# Patient Record
Sex: FEMALE | Race: WHITE | Employment: PART TIME
[De-identification: names, ages, dates, MRNs, and addresses within clinical notes are randomized per-mention and may not be internally consistent; named-entity substitution may affect disease eponyms.]

---

## 2017-08-19 ENCOUNTER — HEALTH MAINTENANCE LETTER (OUTPATIENT)
Age: 57
End: 2017-08-19

## 2019-11-06 ENCOUNTER — HEALTH MAINTENANCE LETTER (OUTPATIENT)
Age: 59
End: 2019-11-06

## 2020-11-29 ENCOUNTER — HEALTH MAINTENANCE LETTER (OUTPATIENT)
Age: 60
End: 2020-11-29

## 2021-09-19 ENCOUNTER — HEALTH MAINTENANCE LETTER (OUTPATIENT)
Age: 61
End: 2021-09-19

## 2021-11-14 ENCOUNTER — HEALTH MAINTENANCE LETTER (OUTPATIENT)
Age: 61
End: 2021-11-14

## 2021-12-21 NOTE — PATIENT DISCUSSION
They tried patching in 2019 but patient didn't like it so they quit.  Uncorrected vision is the same as it was in 2019.  She does have an rx but in the absence of complaints they don't seem inclined to correct it at this time.  Discussed the option to fill the rx going forward if vision becomes problematic.

## 2022-01-09 ENCOUNTER — HEALTH MAINTENANCE LETTER (OUTPATIENT)
Age: 62
End: 2022-01-09

## 2022-07-07 RX ORDER — OLMESARTAN MEDOXOMIL AND HYDROCHLOROTHIAZIDE 40/12.5 40; 12.5 MG/1; MG/1
TABLET ORAL
COMMUNITY

## 2022-07-07 RX ORDER — ESTRADIOL AND NORETHINDRONE ACETATE .5; .1 MG/1; MG/1
TABLET ORAL
COMMUNITY

## 2022-07-07 RX ORDER — LEVOTHYROXINE SODIUM 0.12 MG/1
TABLET ORAL
COMMUNITY
End: 2022-10-06

## 2022-07-07 RX ORDER — ROSUVASTATIN CALCIUM 10 MG/1
TABLET, COATED ORAL
COMMUNITY
Start: 2021-08-12 | End: 2022-10-06

## 2022-07-07 RX ORDER — OMEPRAZOLE 10 MG/1
1 CAPSULE, DELAYED RELEASE ORAL
COMMUNITY

## 2022-08-24 ENCOUNTER — NEW PATIENT (OUTPATIENT)
Dept: URBAN - METROPOLITAN AREA CLINIC 14 | Facility: CLINIC | Age: 62
End: 2022-08-24

## 2022-08-24 DIAGNOSIS — H02.831: ICD-10-CM

## 2022-08-24 DIAGNOSIS — H02.834: ICD-10-CM

## 2022-08-24 DIAGNOSIS — H02.835: ICD-10-CM

## 2022-08-24 DIAGNOSIS — H02.832: ICD-10-CM

## 2022-08-24 PROCEDURE — 92082 INTERMEDIATE VISUAL FIELD XM: CPT

## 2022-08-24 PROCEDURE — 99204 OFFICE O/P NEW MOD 45 MIN: CPT

## 2022-08-24 PROCEDURE — 92285 EXTERNAL OCULAR PHOTOGRAPHY: CPT

## 2022-08-24 ASSESSMENT — VISUAL ACUITY
OD_SC: 20/20-2
OS_SC: 20/30

## 2022-10-02 PROBLEM — E03.9 HYPOTHYROIDISM: Status: ACTIVE | Noted: 2022-10-02

## 2022-10-02 PROBLEM — E55.9 VITAMIN D DEFICIENCY: Status: ACTIVE | Noted: 2022-10-02

## 2022-10-02 PROBLEM — K21.9 GASTROESOPHAGEAL REFLUX DISEASE: Status: ACTIVE | Noted: 2022-10-02

## 2022-10-02 PROBLEM — E66.9 OBESITY (BMI 30-39.9): Status: ACTIVE | Noted: 2022-10-02

## 2022-10-02 PROBLEM — E78.2 MIXED HYPERLIPIDEMIA: Status: ACTIVE | Noted: 2022-10-02

## 2022-10-02 PROBLEM — M25.872 IMPINGEMENT SYNDROME OF LEFT ANKLE: Status: ACTIVE | Noted: 2022-10-02

## 2022-10-02 PROBLEM — R73.03 PREDIABETES: Status: ACTIVE | Noted: 2022-10-02

## 2022-10-02 PROBLEM — I10 ESSENTIAL HYPERTENSION: Status: ACTIVE | Noted: 2022-10-02

## 2022-10-02 PROBLEM — M20.41 HAMMERTOE OF RIGHT FOOT: Status: ACTIVE | Noted: 2022-10-02

## 2022-10-02 PROBLEM — M20.21 HALLUX RIGIDUS, RIGHT FOOT: Status: ACTIVE | Noted: 2022-10-02

## 2022-11-09 ENCOUNTER — NEW PATIENT (OUTPATIENT)
Dept: URBAN - METROPOLITAN AREA CLINIC 4 | Facility: CLINIC | Age: 62
End: 2022-11-09

## 2022-11-09 DIAGNOSIS — H43.812: ICD-10-CM

## 2022-11-09 DIAGNOSIS — H02.835: ICD-10-CM

## 2022-11-09 DIAGNOSIS — H02.834: ICD-10-CM

## 2022-11-09 DIAGNOSIS — H02.832: ICD-10-CM

## 2022-11-09 DIAGNOSIS — H02.831: ICD-10-CM

## 2022-11-09 PROCEDURE — 92014 COMPRE OPH EXAM EST PT 1/>: CPT

## 2022-11-09 PROCEDURE — 92134 CPTRZ OPH DX IMG PST SGM RTA: CPT

## 2022-11-09 ASSESSMENT — TONOMETRY
OS_IOP_MMHG: 13
OD_IOP_MMHG: 11

## 2022-11-09 ASSESSMENT — VISUAL ACUITY
OD_SC: 20/25+1
OS_SC: 20/25

## 2022-11-21 ENCOUNTER — HEALTH MAINTENANCE LETTER (OUTPATIENT)
Age: 62
End: 2022-11-21

## 2022-12-01 ENCOUNTER — FOLLOW UP (OUTPATIENT)
Dept: URBAN - METROPOLITAN AREA CLINIC 17 | Facility: CLINIC | Age: 62
End: 2022-12-01

## 2022-12-01 PROCEDURE — 92012 INTRM OPH EXAM EST PATIENT: CPT

## 2022-12-01 ASSESSMENT — VISUAL ACUITY
OS_SC: 20/20
OD_SC: 20/25-2

## 2022-12-01 ASSESSMENT — TONOMETRY
OD_IOP_MMHG: 18
OS_IOP_MMHG: 22

## 2022-12-20 ENCOUNTER — TECH ONLY (OUTPATIENT)
Dept: URBAN - METROPOLITAN AREA CLINIC 14 | Facility: CLINIC | Age: 62
End: 2022-12-20

## 2022-12-20 PROCEDURE — 99024 POSTOP FOLLOW-UP VISIT: CPT

## 2022-12-20 ASSESSMENT — VISUAL ACUITY
OS_SC: 20/25
OD_SC: 20/25-2

## 2023-01-27 ENCOUNTER — POST-OP (OUTPATIENT)
Dept: URBAN - METROPOLITAN AREA CLINIC 14 | Facility: CLINIC | Age: 63
End: 2023-01-27

## 2023-01-27 DIAGNOSIS — H02.834: ICD-10-CM

## 2023-01-27 DIAGNOSIS — Z98.890: ICD-10-CM

## 2023-01-27 DIAGNOSIS — H02.832: ICD-10-CM

## 2023-01-27 DIAGNOSIS — H02.835: ICD-10-CM

## 2023-01-27 DIAGNOSIS — H02.831: ICD-10-CM

## 2023-01-27 PROCEDURE — 99024 POSTOP FOLLOW-UP VISIT: CPT

## 2023-01-27 ASSESSMENT — VISUAL ACUITY
OD_SC: 20/25-2
OS_SC: 20/25

## 2023-04-16 ENCOUNTER — HEALTH MAINTENANCE LETTER (OUTPATIENT)
Age: 63
End: 2023-04-16

## 2023-11-26 ENCOUNTER — HEALTH MAINTENANCE LETTER (OUTPATIENT)
Age: 63
End: 2023-11-26

## 2024-09-11 ENCOUNTER — COMPREHENSIVE EXAM (OUTPATIENT)
Dept: URBAN - METROPOLITAN AREA CLINIC 17 | Facility: CLINIC | Age: 64
End: 2024-09-11

## 2024-09-11 DIAGNOSIS — E11.9: ICD-10-CM

## 2024-09-11 DIAGNOSIS — H43.813: ICD-10-CM

## 2024-09-11 DIAGNOSIS — H25.13: ICD-10-CM

## 2024-09-11 DIAGNOSIS — H40.013: ICD-10-CM

## 2024-09-11 PROCEDURE — 99214 OFFICE O/P EST MOD 30 MIN: CPT

## 2024-09-11 PROCEDURE — 92015 DETERMINE REFRACTIVE STATE: CPT

## 2024-11-07 ENCOUNTER — DIAGNOSTICS ONLY (OUTPATIENT)
Dept: URBAN - METROPOLITAN AREA CLINIC 17 | Facility: CLINIC | Age: 64
End: 2024-11-07

## 2024-11-07 DIAGNOSIS — H40.013: ICD-10-CM

## 2024-11-07 PROCEDURE — 92083 EXTENDED VISUAL FIELD XM: CPT

## 2024-11-07 PROCEDURE — 92133 CPTRZD OPH DX IMG PST SGM ON: CPT

## 2025-03-12 ENCOUNTER — FOLLOW UP (OUTPATIENT)
Age: 65
End: 2025-03-12

## 2025-03-12 DIAGNOSIS — H40.013: ICD-10-CM

## 2025-03-12 PROCEDURE — 99213 OFFICE O/P EST LOW 20 MIN: CPT
